# Patient Record
(demographics unavailable — no encounter records)

---

## 2025-05-16 NOTE — HISTORY OF PRESENT ILLNESS
[FreeTextEntry1] : F/u HS [de-identified] : 46yF last seen 07/2022 presenting for eval of below:  Hidradenitis in groin, was on donna 75mg daily until 2023, discontinued while lesions were stable and not flaring. In fall 2024 flares recurred in groin with painful bumps, tried several home remedies and went to ER for an I&D of L inguinal fold lesion. Now has persistent painful bumps on both sides of groin. Had one painful bump in L underarm which has improved without intervention. Not pregnant or family planning. Notes frequent sweating and concern for early menopausal changes. Has upcoming visit with GYN surgery due to enlarged uterus?  Derm hx: was on donna 50mg/d and increased to 75mg/day, also has taken doxycycline for flares in past and did well with ILK injections

## 2025-05-16 NOTE — ASSESSMENT
[FreeTextEntry1] : # Hidradenitis suppurativa, chronic, flaring; Jiang II - Restart Spironolactone 75mg/day. SED including lightheadedness/dizziness, breast tenderness, menstrual irregularities, headache, urinary frequency - Start doxycycline 100mg BID x 2 weeks with large meal and water. SED including GI upset and photosensitivity - Procedure: Intralesional triamcinolone Injection Indication: HS flare Solution: 10 mg/mL Kenalog LOT [id number] Total volume injected: 0.8 mL Number of injection sites: 3 Description of Procedure: The treatment location was broadly cleansed with alcohol antiseptic and allowed to dry. The lesion was injected with a 30 gauge 1/2 inch needle and infiltrated in the dermal plane with Kenalog. The total volume of Kenalog injected is noted above and the total number of injection sites is also noted. A sterile bandage was placed over the treatment area and follow-up arrangements were made. The patient tolerated the procedure well.  RTC 2 months

## 2025-05-16 NOTE — PHYSICAL EXAM
[FreeTextEntry3] : bilateral medial thighs with hyperpigmented tender nodules L inguinal fold with linear subcutaneous nodule hyperpigmented patch in L axilla

## 2025-05-16 NOTE — HISTORY OF PRESENT ILLNESS
[FreeTextEntry1] : F/u HS [de-identified] : 46yF last seen 07/2022 presenting for eval of below:  Hidradenitis in groin, was on donna 75mg daily until 2023, discontinued while lesions were stable and not flaring. In fall 2024 flares recurred in groin with painful bumps, tried several home remedies and went to ER for an I&D of L inguinal fold lesion. Now has persistent painful bumps on both sides of groin. Had one painful bump in L underarm which has improved without intervention. Not pregnant or family planning. Notes frequent sweating and concern for early menopausal changes. Has upcoming visit with GYN surgery due to enlarged uterus?  Derm hx: was on donna 50mg/d and increased to 75mg/day, also has taken doxycycline for flares in past and did well with ILK injections